# Patient Record
Sex: MALE | Race: WHITE | URBAN - METROPOLITAN AREA
[De-identification: names, ages, dates, MRNs, and addresses within clinical notes are randomized per-mention and may not be internally consistent; named-entity substitution may affect disease eponyms.]

---

## 2020-10-06 ENCOUNTER — OFFICE VISIT (OUTPATIENT)
Dept: URBAN - METROPOLITAN AREA CLINIC 16 | Facility: CLINIC | Age: 85
End: 2020-10-06
Payer: MEDICARE

## 2020-10-06 DIAGNOSIS — Z96.1 PRESENCE OF PSEUDOPHAKIA: Primary | ICD-10-CM

## 2020-10-06 DIAGNOSIS — H53.001 UNSPECIFIED AMBLYOPIA, RIGHT EYE: ICD-10-CM

## 2020-10-06 DIAGNOSIS — H52.223 REGULAR ASTIGMATISM, BILATERAL: ICD-10-CM

## 2020-10-06 PROCEDURE — 92004 COMPRE OPH EXAM NEW PT 1/>: CPT | Performed by: OPTOMETRIST

## 2020-10-06 ASSESSMENT — KERATOMETRY
OD: 42.88
OS: 42.38

## 2020-10-06 ASSESSMENT — INTRAOCULAR PRESSURE
OS: 9
OD: 8

## 2020-10-06 ASSESSMENT — VISUAL ACUITY
OD: 20/30
OS: 20/25

## 2020-10-06 NOTE — IMPRESSION/PLAN
Impression: Unspecified amblyopia, right eye: H53.001. Plan: Discussed diagnosis. Will continue to observe.